# Patient Record
Sex: FEMALE | Race: WHITE | NOT HISPANIC OR LATINO | URBAN - METROPOLITAN AREA
[De-identification: names, ages, dates, MRNs, and addresses within clinical notes are randomized per-mention and may not be internally consistent; named-entity substitution may affect disease eponyms.]

---

## 2017-11-08 NOTE — PATIENT DISCUSSION
BLEPHARITIS, OU: PRESCRIBE WARM COMPRESSES AND EYELID SCRUBS QD-BID, ARTIFICIAL TEARS BID-QID. PRESCRIBED ERYTHROMYCIN FRANCK QHS OU. RETURN FOR FOLLOW-UP AS SCHEDULED.

## 2017-11-08 NOTE — PATIENT DISCUSSION
INTERNAL HORDEOLUM/EARLY PRESEPTAL RUL: PRESCRIBED WARM COMPRESSES, EYELID SCRUBS AND DOXYCYCLINE 100mg PO BID x 1 week then QD x 1 week then D/C. CALL/RTC IF SYMPTOMS PERSIST/INCREASE.

## 2017-11-08 NOTE — PATIENT DISCUSSION
CHALAZION NAWAF: PRESCRIBED WARM COMPRESSES, EYELID SCRUBS, ERYTHROMYCIN FRANCK QHS OS. IF PERSISTENT/BOTHERSOME, REFER TO DR. ANAND.

## 2017-11-08 NOTE — PATIENT DISCUSSION
New Prescription: doxycycline monohydrate (doxycycline monohydrate): capsule: 100 mg 1 capsule twice a day by mouth 11-

## 2017-11-21 NOTE — PATIENT DISCUSSION
New Prescription: doxycycline monohydrate (doxycycline monohydrate): tablet: 50 mg 1 tablet once a day by mouth 11-

## 2017-11-21 NOTE — PATIENT DISCUSSION
BLEPHARITIS, OU: PRESCRIBE WARM COMPRESSES AND EYELID SCRUBS QD-BID, ARTIFICIAL TEARS BID-QID. ERYTHROMCYIN FRANCK QHS OU. RETURN FOR FOLLOW-UP AS SCHEDULED.

## 2017-11-21 NOTE — PATIENT DISCUSSION
Continue: erythromycin (erythromycin): ointment: 5 mg/gram (0.5 %) 1 a small amount at bedtime as needed on eyelid 11-

## 2017-11-21 NOTE — PATIENT DISCUSSION
TRAVIS KEY &amp; NAWAF: PRESCRIBED WARM COMPRESSES, EYELID SCRUBS AND DOXYCYCLINE 50mg QD x 2 weeks. ERYTHROMYCIN FRANCK QHS OU. REFER TO [facility]ULOPLASTICS IF SYMPTOMS PERSIST- PT TO CALL BACK.

## 2019-08-26 NOTE — PATIENT DISCUSSION
TRICHIASIS, OS: LASHES EPILATED WITHOUT DIFFICULTY FROM LLL. IF RECURRENT WILL CONSIDER ELLMAN PROCEDURE.

## 2019-08-26 NOTE — PATIENT DISCUSSION
OCULAR ROSACEA, OD:  PRESCRIBE WARM COMPRESSES AND EYELID SCRUBS QD-BID, ARTIFICIAL TEARS BID-QID AND THE DAILY INTAKE OF OMEGA-3 FATTY ACIDS. CONSIDER TOPICAL STEROID, ORAL TETRACYCLINE OR LIPIFLOW FOR EXACERBATIONS. RETURN FOR FOLLOW-UP AS SCHEDULED.

## 2019-08-26 NOTE — PATIENT DISCUSSION
New Prescription: Maxitrol (neomycin-polymyxin-dexameth): ointment: 3.5-10,000-0.1 mg-unit/g-% 1 a small amount twice a day into affected eye 08-

## 2019-09-20 NOTE — PATIENT DISCUSSION
Continue: doxycycline hyclate (doxycycline hyclate): tablet: 100 mg 1 tablet twice a day as directed by mouth 08-

## 2019-09-20 NOTE — PATIENT DISCUSSION
OCULAR ROSACEA, OU: PRESCRIBE WARM COMPRESSES DAILY AND EYELID SCRUBS QD-BID, ARTIFICIAL TEARS BID-QID AND THE DAILY INTAKE OF OMEGA-3 FATTY ACIDS. DISCUSSED PROPER WAY TO USE LID SCRUBS. PT TO KEEP MAXITROL FOR FLARE UPS . RETURN FOR FOLLOW-UP AS SCHEDULED.

## 2019-09-20 NOTE — PATIENT DISCUSSION
Continue: Maxitrol (neomycin-polymyxin-dexameth): ointment: 3.5-10,000-0.1 mg-unit/g-% 1 a small amount twice a day into affected eye 08-

## 2020-12-23 NOTE — PATIENT DISCUSSION
CHALAZION RLL: PRESCRIBED WARM COMPRESSES, EYELID SCRUBS AND DOXYCYCLINE 100MG 1 PO BID X 10 DAYS AND DISCONTINUE.

## 2021-12-17 ENCOUNTER — PREPPED CHART (OUTPATIENT)
Dept: URBAN - METROPOLITAN AREA CLINIC 10 | Facility: CLINIC | Age: 36
End: 2021-12-17

## 2022-01-19 ENCOUNTER — EMERGENCY VISIT (OUTPATIENT)
Dept: URBAN - METROPOLITAN AREA CLINIC 10 | Facility: CLINIC | Age: 37
End: 2022-01-19

## 2022-01-19 DIAGNOSIS — B02.33: ICD-10-CM

## 2022-01-19 PROCEDURE — 92012 INTRM OPH EXAM EST PATIENT: CPT

## 2022-01-19 ASSESSMENT — VISUAL ACUITY
OS_CC: 20/25
OD_CC: 20/25
OU_CC: 20/20

## 2022-01-28 ENCOUNTER — ESTABLISHED PATIENT (OUTPATIENT)
Dept: URBAN - METROPOLITAN AREA CLINIC 10 | Facility: CLINIC | Age: 37
End: 2022-01-28

## 2022-01-28 DIAGNOSIS — H52.13: ICD-10-CM

## 2022-01-28 PROCEDURE — 92014 COMPRE OPH EXAM EST PT 1/>: CPT

## 2022-01-28 PROCEDURE — 92015 DETERMINE REFRACTIVE STATE: CPT

## 2022-01-28 PROCEDURE — 92310C CONTACT LENS 75

## 2022-01-28 ASSESSMENT — TONOMETRY
OS_IOP_MMHG: 20
OD_IOP_MMHG: 20

## 2022-01-28 ASSESSMENT — KERATOMETRY
OD_K1POWER_DIOPTERS: 44.00
OS_AXISANGLE2_DEGREES: 95
OS_K2POWER_DIOPTERS: 44.25
OD_AXISANGLE_DEGREES: 125
OD_AXISANGLE2_DEGREES: 35
OS_AXISANGLE_DEGREES: 005
OS_K1POWER_DIOPTERS: 43.75
OD_K2POWER_DIOPTERS: 44.50

## 2022-07-29 ENCOUNTER — ESTABLISHED PATIENT (OUTPATIENT)
Dept: URBAN - METROPOLITAN AREA CLINIC 10 | Facility: CLINIC | Age: 37
End: 2022-07-29

## 2022-07-29 DIAGNOSIS — Q15.0: ICD-10-CM

## 2022-07-29 DIAGNOSIS — H47.233: ICD-10-CM

## 2022-07-29 DIAGNOSIS — Z46.0: ICD-10-CM

## 2022-07-29 DIAGNOSIS — H52.13: ICD-10-CM

## 2022-07-29 DIAGNOSIS — B02.33: ICD-10-CM

## 2022-07-29 PROCEDURE — 92133 CPTRZD OPH DX IMG PST SGM ON: CPT

## 2022-07-29 PROCEDURE — 92083 EXTENDED VISUAL FIELD XM: CPT

## 2022-07-29 PROCEDURE — 99213 OFFICE O/P EST LOW 20 MIN: CPT

## 2022-07-29 ASSESSMENT — VISUAL ACUITY
OS_CC: 20/20-1
OU_CC: 20/20
OD_CC: 20/20

## 2022-07-29 ASSESSMENT — TONOMETRY
OS_IOP_MMHG: 18
OD_IOP_MMHG: 16

## 2023-01-05 NOTE — PATIENT DISCUSSION
New Prescription: doxycycline hyclate (doxycycline hyclate): tablet,delayed release (DR/EC): 100 mg 1 tablet twice a day by mouth 12- Debridement Text: The wound edges were debrided prior to proceeding with the closure to facilitate wound healing.

## 2023-01-09 ENCOUNTER — ESTABLISHED PATIENT (OUTPATIENT)
Dept: URBAN - METROPOLITAN AREA CLINIC 10 | Facility: CLINIC | Age: 38
End: 2023-01-09

## 2023-01-09 DIAGNOSIS — H52.13: ICD-10-CM

## 2023-01-09 DIAGNOSIS — H47.233: ICD-10-CM

## 2023-01-09 DIAGNOSIS — Q15.0: ICD-10-CM

## 2023-01-09 PROCEDURE — 92014 COMPRE OPH EXAM EST PT 1/>: CPT

## 2023-01-09 PROCEDURE — 92250 FUNDUS PHOTOGRAPHY W/I&R: CPT

## 2023-01-09 PROCEDURE — 92015 DETERMINE REFRACTIVE STATE: CPT

## 2023-01-09 ASSESSMENT — KERATOMETRY
OD_K1POWER_DIOPTERS: 44.00
OD_K2POWER_DIOPTERS: 44.50
OD_AXISANGLE2_DEGREES: 35
OS_AXISANGLE2_DEGREES: 85
OD_AXISANGLE_DEGREES: 125
OS_K1POWER_DIOPTERS: 43.75
OS_K2POWER_DIOPTERS: 44.00
OS_AXISANGLE_DEGREES: 175

## 2023-01-09 ASSESSMENT — TONOMETRY
OD_IOP_MMHG: 22
OS_IOP_MMHG: 22

## 2023-01-09 ASSESSMENT — VISUAL ACUITY
OS_CC: 20/20
OD_CC: 20/20
OU_CC: 20/20

## 2023-07-11 ASSESSMENT — KERATOMETRY
OD_K2POWER_DIOPTERS: 44.50
OD_AXISANGLE2_DEGREES: 35
OS_K2POWER_DIOPTERS: 44.00
OS_AXISANGLE2_DEGREES: 85
OD_K1POWER_DIOPTERS: 44.00
OS_AXISANGLE_DEGREES: 175
OD_AXISANGLE_DEGREES: 125
OS_K1POWER_DIOPTERS: 43.75

## 2023-07-12 ENCOUNTER — DIAGNOSTICS ONLY (OUTPATIENT)
Dept: URBAN - METROPOLITAN AREA CLINIC 10 | Facility: CLINIC | Age: 38
End: 2023-07-12

## 2023-07-12 DIAGNOSIS — Q15.0: ICD-10-CM

## 2023-07-12 PROCEDURE — 92083 EXTENDED VISUAL FIELD XM: CPT

## 2023-07-12 PROCEDURE — 92133 CPTRZD OPH DX IMG PST SGM ON: CPT

## 2023-07-12 PROCEDURE — 99213 OFFICE O/P EST LOW 20 MIN: CPT

## 2023-07-12 ASSESSMENT — VISUAL ACUITY
OU_CC: 20/20
OD_CC: 20/25+2
OS_CC: 20/20-1

## 2023-07-12 ASSESSMENT — TONOMETRY
OD_IOP_MMHG: 19
OS_IOP_MMHG: 20

## 2023-09-18 ENCOUNTER — ESTABLISHED PATIENT (OUTPATIENT)
Dept: URBAN - METROPOLITAN AREA CLINIC 10 | Facility: CLINIC | Age: 38
End: 2023-09-18

## 2023-09-18 DIAGNOSIS — H47.233: ICD-10-CM

## 2023-09-18 DIAGNOSIS — H52.13: ICD-10-CM

## 2023-09-18 PROCEDURE — 92310C CONTACT LENS 75

## 2023-09-18 PROCEDURE — 92014 COMPRE OPH EXAM EST PT 1/>: CPT

## 2023-09-18 PROCEDURE — 92015 DETERMINE REFRACTIVE STATE: CPT

## 2023-09-18 ASSESSMENT — KERATOMETRY
OS_K2POWER_DIOPTERS: 44.00
OS_K1POWER_DIOPTERS: 43.75
OS_AXISANGLE_DEGREES: 170
OD_AXISANGLE2_DEGREES: 40
OS_AXISANGLE2_DEGREES: 80
OD_K2POWER_DIOPTERS: 44.50
OD_AXISANGLE_DEGREES: 130
OD_K1POWER_DIOPTERS: 44.00

## 2023-09-18 ASSESSMENT — VISUAL ACUITY
OS_CC: 20/20
OD_CC: 20/20

## 2023-09-18 ASSESSMENT — TONOMETRY
OS_IOP_MMHG: 19
OD_IOP_MMHG: 19

## 2024-07-03 ENCOUNTER — DIAGNOSTICS ONLY (OUTPATIENT)
Dept: URBAN - METROPOLITAN AREA CLINIC 10 | Facility: CLINIC | Age: 39
End: 2024-07-03

## 2024-07-03 DIAGNOSIS — H47.233: ICD-10-CM

## 2024-07-03 PROCEDURE — 92083 EXTENDED VISUAL FIELD XM: CPT

## 2024-07-03 PROCEDURE — 92133 CPTRZD OPH DX IMG PST SGM ON: CPT

## 2024-07-03 PROCEDURE — 99214 OFFICE O/P EST MOD 30 MIN: CPT

## 2024-07-03 ASSESSMENT — VISUAL ACUITY
OD_CC: 20/20-1
OS_CC: 20/20-1
OU_CC: 20/20-1

## 2024-07-03 ASSESSMENT — TONOMETRY
OD_IOP_MMHG: 28
OD_IOP_MMHG: 18
OS_IOP_MMHG: 19
OS_IOP_MMHG: 25

## 2024-07-03 ASSESSMENT — KERATOMETRY
OD_AXISANGLE2_DEGREES: 40
OS_AXISANGLE2_DEGREES: 80
OD_K1POWER_DIOPTERS: 44.00
OD_K2POWER_DIOPTERS: 44.50
OS_K2POWER_DIOPTERS: 44.00
OS_AXISANGLE_DEGREES: 170
OS_K1POWER_DIOPTERS: 43.75
OD_AXISANGLE_DEGREES: 130

## 2025-01-06 ENCOUNTER — FOLLOW UP (OUTPATIENT)
Age: 40
End: 2025-01-06

## 2025-01-06 DIAGNOSIS — H47.233: ICD-10-CM

## 2025-01-06 DIAGNOSIS — H52.13: ICD-10-CM

## 2025-01-06 PROCEDURE — 92014 COMPRE OPH EXAM EST PT 1/>: CPT

## 2025-01-06 PROCEDURE — 92310-3 LEVEL 3 SOFT LENS UPDATE

## 2025-01-06 PROCEDURE — 92015 DETERMINE REFRACTIVE STATE: CPT
